# Patient Record
Sex: FEMALE | Race: WHITE | NOT HISPANIC OR LATINO | Employment: OTHER | URBAN - METROPOLITAN AREA
[De-identification: names, ages, dates, MRNs, and addresses within clinical notes are randomized per-mention and may not be internally consistent; named-entity substitution may affect disease eponyms.]

---

## 2018-02-14 NOTE — PATIENT DISCUSSION
CATARACTS, OU: VISUALLY SIGNIFICANT. OPTION OF SURGERY VERSUS FOLLOWING VERSUS UPDATING GLASSES DISCUSSED. RBA'S DISCUSSED, PATIENT UNDERSTANDS AND DESIRES SURGERY TO INCREASE VISION FOR DRIVING SAFELY AND TO REDUCE GLARE. SCHEDULE CATARACT SURGERY/PRE-OP OU.

## 2018-02-14 NOTE — PATIENT DISCUSSION
Continue: [employer] Nutrition (om-3-epa-dha-fish oil-flax-e): capsule: [phone] hj-mc-is-unit

## 2018-03-01 NOTE — PATIENT DISCUSSION
CATARACT OD: RBA'S DISCUSSED, PATIENT UNDERSTANDS AND DESIRES TO PROCEED WITH SURGERY. CONSENT READ AND SIGNED. IMPRIMIS CALLED IN. PATIENT ADVISED TO DISCONTINUE FLOMAX 7 DAYS PRIOR. PATIENT DESIRES STANDARD SET FOR DISTANCE VISION.

## 2018-03-28 NOTE — PATIENT DISCUSSION
S/P PE IOL, OD. STABLE. CONTINUE DROPS AS DIRECTED- STRESSED IMPORTANCE OF COMPLIANCE WITH EYE DROPS. RETURN FOR FOLLOW-UP IN 2 WEEKS.

## 2018-04-12 NOTE — PATIENT DISCUSSION
CATARACT OS: RBA'S DISCUSSED, PATIENT UNDERSTANDS AND DESIRES TO PROCEED WITH SURGERY. CONSENT READ AND SIGNED. PATIENT DESIRES STANDARD SET FOR DISTANCE VISION.

## 2018-04-12 NOTE — PATIENT DISCUSSION
CATARACT, OS: VISUALLY SIGNIFICANT. OPTION OF SURGERY VERSUS FOLLOWING VERSUS UPDATING GLASSES DISCUSSED. RBA'S DISCUSSED TODAY WITH DR. SANTOS, PATIENT UNDERSTANDS AND DESIRES SURGERY TO INCREASE VISION FOR DRIVING AND WATCHING TV. RTC FOR CAT SX OS

## 2020-03-03 NOTE — PATIENT DISCUSSION
Call or return for problems  Wound care as instructed; clean 3 times a day with warm soapy water and apply ointment provided  Follow up in 10 days for suture removal       Medications:

## 2020-11-30 NOTE — PATIENT DISCUSSION
05/11/2018OS-0.50+0.10253+2.5020/20 -&nbsp;SN &nbsp; &nbsp;
Anti-reflective
Bifocal - Daily wearOD-0.25+0.89330+2.5020/20&nbsp;SN &nbsp; &nbsp;
Comments:POST OP CAT SX OD 3/27/18 OS 4/26/18
Continue: IMPRIMIS-PMH: PRED-GATI-BROM: 1/0.5/0.075% 1 drop as directed into affected eye
General:
Glasses Prescribed:
Lens Material:
Lens Treatment:
Medications:
S/P PC IOL, OU. DOING WELL. CONTINUE DROPS AS DIRECTED. SPECS RX OFFERED. RX ARC IN SPECS TO MINIMIZE GLARE. RETURN FOR FOLLOW-UP AS SCHEDULED.
Slab Off:No
UV Protection
Vertex Distance:
spherecylinderaxisaddprismvertexVAInt VANVExaminer
Other:

## 2021-09-03 NOTE — PATIENT DISCUSSION
Continue: prednisolone-moxiflox-bromfen (prednisolone-moxiflox-bromfen): drops,suspension: 1-0.5-0.075% 1 drop three times a day as directed into affected eye 09-

## 2021-09-30 NOTE — PATIENT DISCUSSION
Surgery  Counseling: I have discussed the option of glasses versus cataract surgery versus following . It was explained that when vision no longer meets the patient's visual needs and a new prescription for glasses is not likely to improve all of the patient's visual symptoms, the option of cataract surgery is a reasonable next step. It was explained that there is no guarantee that removing the cataract will improve their visual symptoms, however; it is believed that the cataract is contributing to the patient's visual impairment and surgery may significantly improve both the visual and functional status of the patient. The risks, benefits and alternatives of surgery were discussed with the patient. After this discussion, the patient desires to proceed with cataract surgery with implantation of an intraocular lens to improve vision for [place] AND GLARE.

## 2021-10-06 NOTE — PATIENT DISCUSSION
Continue: prednisolone-moxiflox-bromfen (prednisolone-moxiflox-bromfen): drops,suspension: 1-0.5-0.075% 1 drop three times a day as directed into affected eye 09-.

## 2021-12-02 NOTE — PATIENT DISCUSSION
Patient to phase 2 from pacu, vss on room air, call light within reach,will continue to monitor.  at bedside. RETINA IS ATTACHED OU: PVD OU; NO HOLES OR TEARS SEEN ON DILATED EXAM TODAY.  RETINAL DETACHMENT SIGNS AND SYMPTOMS REVIEWED

## 2022-02-18 ENCOUNTER — NEW PATIENT (OUTPATIENT)
Dept: URBAN - METROPOLITAN AREA CLINIC 35 | Facility: CLINIC | Age: 57
End: 2022-02-18

## 2022-02-18 DIAGNOSIS — H52.203: ICD-10-CM

## 2022-02-18 DIAGNOSIS — H52.4: ICD-10-CM

## 2022-02-18 PROCEDURE — 92015 DETERMINE REFRACTIVE STATE: CPT

## 2022-02-18 PROCEDURE — 92004 COMPRE OPH EXAM NEW PT 1/>: CPT

## 2022-02-18 ASSESSMENT — TONOMETRY
OD_IOP_MMHG: 17
OS_IOP_MMHG: 16

## 2022-02-18 ASSESSMENT — VISUAL ACUITY
OU_CC: J1
OS_CC: 20/30
OU_SC: J12
OD_SC: 20/25
OU_CC: 20/25
OD_CC: J2
OS_SC: 20/30-1
OS_CC: J4
OU_SC: 20/25
OD_CC: 20/20

## 2022-02-18 NOTE — PATIENT DISCUSSION
Pt. has a 3 hour road trip and cannot be dilated, dis peep hole vs. door open and disease which can be sight threatening might be missed without dfex.

## 2022-03-14 ENCOUNTER — FOLLOW UP (OUTPATIENT)
Dept: URBAN - METROPOLITAN AREA CLINIC 35 | Facility: CLINIC | Age: 57
End: 2022-03-14

## 2022-03-14 DIAGNOSIS — H52.4: ICD-10-CM

## 2022-03-14 DIAGNOSIS — H52.203: ICD-10-CM

## 2022-03-14 PROCEDURE — 99211T TECH SERVICE

## 2022-03-14 ASSESSMENT — VISUAL ACUITY
OS_SC: 20/30+1
OD_SC: 20/25+2

## 2022-10-24 ENCOUNTER — CONSULTATION/EVALUATION (OUTPATIENT)
Dept: URBAN - METROPOLITAN AREA CLINIC 39 | Facility: CLINIC | Age: 57
End: 2022-10-24

## 2022-10-24 DIAGNOSIS — H25.813: ICD-10-CM

## 2022-10-24 DIAGNOSIS — H40.023: ICD-10-CM

## 2022-10-24 PROCEDURE — 92014 COMPRE OPH EXAM EST PT 1/>: CPT

## 2022-10-24 PROCEDURE — 76514 ECHO EXAM OF EYE THICKNESS: CPT

## 2022-10-24 PROCEDURE — 92132 CPTRZD OPH DX IMG ANT SGM: CPT

## 2022-10-24 PROCEDURE — 92020 GONIOSCOPY: CPT

## 2022-10-24 PROCEDURE — 92250 FUNDUS PHOTOGRAPHY W/I&R: CPT

## 2022-10-24 ASSESSMENT — PACHYMETRY
OS_CT_UM: 554
OD_CT_UM: 556

## 2022-10-24 ASSESSMENT — VISUAL ACUITY
OS_CC: 20/20
OD_CC: J1
OS_SC: 20/20-1
OD_SC: J12
OD_SC: 20/20-1
OD_CC: 20/20
OS_SC: J12
OS_CC: J2

## 2022-10-24 ASSESSMENT — TONOMETRY
OD_IOP_MMHG: 16
OS_IOP_MMHG: 16

## 2024-02-26 ENCOUNTER — COMPREHENSIVE EXAM (OUTPATIENT)
Dept: URBAN - METROPOLITAN AREA CLINIC 35 | Facility: CLINIC | Age: 59
End: 2024-02-26

## 2024-02-26 DIAGNOSIS — H52.203: ICD-10-CM

## 2024-02-26 DIAGNOSIS — H40.023: ICD-10-CM

## 2024-02-26 DIAGNOSIS — H25.813: ICD-10-CM

## 2024-02-26 DIAGNOSIS — H52.4: ICD-10-CM

## 2024-02-26 PROCEDURE — 92014 COMPRE OPH EXAM EST PT 1/>: CPT

## 2024-02-26 PROCEDURE — 92133 CPTRZD OPH DX IMG PST SGM ON: CPT

## 2024-02-26 PROCEDURE — 92015 DETERMINE REFRACTIVE STATE: CPT

## 2024-02-26 ASSESSMENT — VISUAL ACUITY
OU_CC: 20/20-2
OS_CC: 20/30
OD_CC: 20/25-1
OU_CC: J1
OD_CC: J2
OS_CC: J2

## 2024-02-26 ASSESSMENT — TONOMETRY
OS_IOP_MMHG: 20
OD_IOP_MMHG: 19

## 2025-02-27 ENCOUNTER — COMPREHENSIVE EXAM (OUTPATIENT)
Age: 60
End: 2025-02-27

## 2025-02-27 DIAGNOSIS — H52.203: ICD-10-CM

## 2025-02-27 DIAGNOSIS — H52.4: ICD-10-CM

## 2025-02-27 DIAGNOSIS — H25.813: ICD-10-CM

## 2025-02-27 DIAGNOSIS — H40.023: ICD-10-CM

## 2025-02-27 PROCEDURE — 92133 CPTRZD OPH DX IMG PST SGM ON: CPT

## 2025-02-27 PROCEDURE — 92014 COMPRE OPH EXAM EST PT 1/>: CPT

## 2025-02-27 PROCEDURE — 92015 DETERMINE REFRACTIVE STATE: CPT
